# Patient Record
Sex: FEMALE | Race: WHITE | HISPANIC OR LATINO
[De-identification: names, ages, dates, MRNs, and addresses within clinical notes are randomized per-mention and may not be internally consistent; named-entity substitution may affect disease eponyms.]

---

## 2018-05-22 ENCOUNTER — RESULT REVIEW (OUTPATIENT)
Age: 68
End: 2018-05-22

## 2020-07-31 ENCOUNTER — APPOINTMENT (OUTPATIENT)
Dept: DISASTER EMERGENCY | Facility: CLINIC | Age: 70
End: 2020-07-31

## 2020-07-31 DIAGNOSIS — Z01.818 ENCOUNTER FOR OTHER PREPROCEDURAL EXAMINATION: ICD-10-CM

## 2020-08-01 LAB — SARS-COV-2 N GENE NPH QL NAA+PROBE: NOT DETECTED

## 2021-04-17 ENCOUNTER — EMERGENCY (EMERGENCY)
Facility: HOSPITAL | Age: 71
LOS: 1 days | Discharge: HOME | End: 2021-04-17
Attending: EMERGENCY MEDICINE | Admitting: EMERGENCY MEDICINE
Payer: MEDICARE

## 2021-04-17 VITALS
HEIGHT: 64 IN | HEART RATE: 72 BPM | TEMPERATURE: 99 F | RESPIRATION RATE: 16 BRPM | SYSTOLIC BLOOD PRESSURE: 160 MMHG | DIASTOLIC BLOOD PRESSURE: 87 MMHG | OXYGEN SATURATION: 98 %

## 2021-04-17 DIAGNOSIS — Z90.49 ACQUIRED ABSENCE OF OTHER SPECIFIED PARTS OF DIGESTIVE TRACT: Chronic | ICD-10-CM

## 2021-04-17 DIAGNOSIS — Z98.89 OTHER SPECIFIED POSTPROCEDURAL STATES: Chronic | ICD-10-CM

## 2021-04-17 DIAGNOSIS — Z98.51 TUBAL LIGATION STATUS: Chronic | ICD-10-CM

## 2021-04-17 PROCEDURE — 99284 EMERGENCY DEPT VISIT MOD MDM: CPT

## 2021-04-17 PROCEDURE — 73030 X-RAY EXAM OF SHOULDER: CPT | Mod: 26,RT

## 2021-04-17 PROCEDURE — 73060 X-RAY EXAM OF HUMERUS: CPT | Mod: 26,RT

## 2021-04-17 RX ORDER — LIDOCAINE 4 G/100G
1 CREAM TOPICAL ONCE
Refills: 0 | Status: COMPLETED | OUTPATIENT
Start: 2021-04-17 | End: 2021-04-17

## 2021-04-17 RX ORDER — KETOROLAC TROMETHAMINE 30 MG/ML
15 SYRINGE (ML) INJECTION ONCE
Refills: 0 | Status: DISCONTINUED | OUTPATIENT
Start: 2021-04-17 | End: 2021-04-17

## 2021-04-17 RX ADMIN — Medication 15 MILLIGRAM(S): at 10:34

## 2021-04-17 RX ADMIN — LIDOCAINE 1 PATCH: 4 CREAM TOPICAL at 10:34

## 2021-04-17 NOTE — ED PROVIDER NOTE - PATIENT PORTAL LINK FT
You can access the FollowMyHealth Patient Portal offered by [facility] by registering at the following website: [email or web address]. By joining [employer]’s FollowMyHealth portal, you will also be able to view your health information using other applications (apps) compatible with our system.

## 2021-04-17 NOTE — ED PROVIDER NOTE - NSFOLLOWUPINSTRUCTIONS_ED_ALL_ED_FT
You were seen for shoulder pain.     Follow up with orthopedic surgery. Bring a copy of your MRI to this visit.     Seek immediate medical assistance for any new or worsening symptoms.     Please take 600 mg of Ibuprofen (aka Motrin, Advil) and/or 650 mg Acetaminophen (aka Tylenol) every 6 hours, as needed, for mild-moderate pain.    Please do not take these medications if you do not have pain or if you have any history of bleeding disorders, kidney or liver disease.   Do not use ibuprofen if you are on blood thinners (anti-coagulation).     A copy of XRra report is provided. There is no fracture or dislocation.

## 2021-04-17 NOTE — ED PROVIDER NOTE - PMH
Aneurysm  thoracic aorta and brain ( 1.5-2mm A comm aneurysm)  both being monitored  Anxiety    Cholecystitis    Fibromyalgia    Heart murmur  benign  High cholesterol    Hypertension    Osteoarthritis    Thyroid nodule

## 2021-04-17 NOTE — ED ADULT TRIAGE NOTE - CHIEF COMPLAINT QUOTE
pt c/o right arm pain x3-4 months. Pt states she was seen at her PCP and was sent to physical therapy but it is not helping. pt states she thinks she hurt it doing something. Appears comfortable.

## 2021-04-17 NOTE — ED PROVIDER NOTE - OBJECTIVE STATEMENT
70F presents to ED with months of left shoulder pain a/w severely reduced ROM. Patient went to physical therapy for 1 month with no improvement. Patient saw ortho surgeon month ago who did MRI and recommended surgery. Patient has been taking tylneol for the pain.

## 2021-04-17 NOTE — ED PROVIDER NOTE - CLINICAL SUMMARY MEDICAL DECISION MAKING FREE TEXT BOX
Radha - elderly female with chroic shoulder pain, vss exam consistent with rotator cuff tear versus frozen shoulder. Patient had outpt MRI in past and surgeon recc surgery but pt did not have it done. Plan: pain control, XR, ortho fullow up.

## 2021-04-17 NOTE — ED PROVIDER NOTE - ATTENDING CONTRIBUTION TO CARE
agree with resident note    "70F presents to ED with months of left shoulder pain a/w severely reduced ROM. Patient went to physical therapy for 1 month with no improvement. Patient saw ortho surgeon month ago who did MRI and recommended surgery. Patient has been taking tylneol for the pain."    PE: well appearing; VSS; CTAB/L; s1 s2 no m/r/g ext: able to range  left shoulder to 45 degrees; no deformity; radial pulse 2+ on left    Imp: per hx MRI showed surgical pathology ?rotator cuff injury; pt can follow up with that orthopedic physician; xray negative for fx/dislocation

## 2021-04-17 NOTE — ED PROVIDER NOTE - PHYSICAL EXAMINATION
Physical Exam:    I have reviewed the triage vital signs.    Gen: NAD, AOx3, non-toxic appearing, able to ambulate without assistance  Head and Neck: NCAT, Neck supple without meningismus   HEENT: EOMI, PEERLA, normal conjunctiva, tongue midline, oral mucosa moist  Lung: CTAB, no respiratory distress, no wheezes/rhonchi/rales B/L, speaking in full sentences  CV: RRR, no murmurs, rubs or gallops  Abd: soft, NT, ND, no guarding, no rigidity, no rebound tenderness, no CVA tenderness, no masses.   MSK: no gross deformities, ROM normal in LE, no back tenderness. LEft UE - ROM reduced in all planes of motion. Patient cannot lift arm above head. Pain with rom. ttp shoulder. No erythema swelling dislocation deformity or eccymois.   Neuro: CNs II-XII grossly intact. No focal sensory or motor deficits  Skin: Warm, well perfused, no rash, no leg swelling  Psych: Appropriate mood and affect

## 2021-04-20 ENCOUNTER — APPOINTMENT (OUTPATIENT)
Dept: ORTHOPEDIC SURGERY | Facility: CLINIC | Age: 71
End: 2021-04-20
Payer: MEDICARE

## 2021-04-20 VITALS
HEART RATE: 76 BPM | BODY MASS INDEX: 27.29 KG/M2 | DIASTOLIC BLOOD PRESSURE: 84 MMHG | HEIGHT: 63 IN | SYSTOLIC BLOOD PRESSURE: 173 MMHG | WEIGHT: 154 LBS

## 2021-04-20 DIAGNOSIS — M25.511 PAIN IN RIGHT SHOULDER: ICD-10-CM

## 2021-04-20 PROCEDURE — 99203 OFFICE O/P NEW LOW 30 MIN: CPT

## 2021-07-26 ENCOUNTER — APPOINTMENT (OUTPATIENT)
Dept: SPEECH THERAPY | Facility: HOSPITAL | Age: 71
End: 2021-07-26
Payer: MEDICARE

## 2021-07-26 ENCOUNTER — OUTPATIENT (OUTPATIENT)
Dept: OUTPATIENT SERVICES | Facility: HOSPITAL | Age: 71
LOS: 1 days | Discharge: HOME | End: 2021-07-26

## 2021-07-26 ENCOUNTER — OUTPATIENT (OUTPATIENT)
Dept: OUTPATIENT SERVICES | Facility: HOSPITAL | Age: 71
LOS: 1 days | End: 2021-07-26

## 2021-07-26 ENCOUNTER — APPOINTMENT (OUTPATIENT)
Dept: RADIOLOGY | Facility: HOSPITAL | Age: 71
End: 2021-07-26
Payer: MEDICARE

## 2021-07-26 DIAGNOSIS — Z90.49 ACQUIRED ABSENCE OF OTHER SPECIFIED PARTS OF DIGESTIVE TRACT: Chronic | ICD-10-CM

## 2021-07-26 DIAGNOSIS — Z98.51 TUBAL LIGATION STATUS: Chronic | ICD-10-CM

## 2021-07-26 DIAGNOSIS — Z98.89 OTHER SPECIFIED POSTPROCEDURAL STATES: Chronic | ICD-10-CM

## 2021-07-26 DIAGNOSIS — R13.14 DYSPHAGIA, PHARYNGOESOPHAGEAL PHASE: ICD-10-CM

## 2021-07-26 PROCEDURE — 74230 X-RAY XM SWLNG FUNCJ C+: CPT | Mod: 26

## 2021-08-06 DIAGNOSIS — R13.10 DYSPHAGIA, UNSPECIFIED: ICD-10-CM

## 2021-09-01 ENCOUNTER — APPOINTMENT (OUTPATIENT)
Dept: NEUROSURGERY | Facility: CLINIC | Age: 71
End: 2021-09-01

## 2023-05-22 ENCOUNTER — APPOINTMENT (OUTPATIENT)
Dept: RADIOLOGY | Facility: HOSPITAL | Age: 73
End: 2023-05-22
Payer: MEDICARE

## 2023-05-22 ENCOUNTER — OUTPATIENT (OUTPATIENT)
Dept: OUTPATIENT SERVICES | Facility: HOSPITAL | Age: 73
LOS: 1 days | End: 2023-05-22

## 2023-05-22 ENCOUNTER — OUTPATIENT (OUTPATIENT)
Dept: OUTPATIENT SERVICES | Facility: HOSPITAL | Age: 73
LOS: 1 days | Discharge: HOME | End: 2023-05-22

## 2023-05-22 ENCOUNTER — APPOINTMENT (OUTPATIENT)
Dept: SPEECH THERAPY | Facility: HOSPITAL | Age: 73
End: 2023-05-22
Payer: MEDICARE

## 2023-05-22 DIAGNOSIS — Z98.51 TUBAL LIGATION STATUS: Chronic | ICD-10-CM

## 2023-05-22 DIAGNOSIS — Z90.49 ACQUIRED ABSENCE OF OTHER SPECIFIED PARTS OF DIGESTIVE TRACT: Chronic | ICD-10-CM

## 2023-05-22 DIAGNOSIS — Z98.89 OTHER SPECIFIED POSTPROCEDURAL STATES: Chronic | ICD-10-CM

## 2023-05-22 DIAGNOSIS — R13.14 DYSPHAGIA, PHARYNGOESOPHAGEAL PHASE: ICD-10-CM

## 2023-05-22 PROCEDURE — 74220 X-RAY XM ESOPHAGUS 1CNTRST: CPT | Mod: 26

## 2023-05-22 PROCEDURE — 74230 X-RAY XM SWLNG FUNCJ C+: CPT | Mod: 26

## 2023-05-22 NOTE — HISTORY OF PRESENT ILLNESS
[FreeTextEntry1] : Patient arrived to Radiology for an OutPatient Modified Barium Swallow Study. Patient is a 73 y/o female with PMH as per EMR:\par \par Active Problems\par Abnormal MRA, brain (793.0) (R90.89)\par Cerebral arterial aneurysm (437.3) (I67.1)\par Distal radius fracture (813.42) (S52.509A)\par Preop testing (V72.84) (Z01.818)\par \par Past Medical History\par History of Aneurysm of thoracic aorta (441.2) (I71.2)\par History of Anxiety (300.00) (F41.9)\par History of Cerebral arterial aneurysm (437.3) (I67.1)\par History of cardiac disorder (V12.50) (Z86.79)\par History of fibromyositis (V13.59) (Z87.39)\par History of hypercholesterolemia (V12.29) (Z86.39)\par History of hypertension (V12.59) (Z86.79)\par History of Osteoarthritis (V13.4)\par Personal history of cardiac murmur (V12.59) (Z86.79)\par Personal history of osteoporosis (V13.59) (Z87.39)\par \par Of Note: Patient had an OutPatient Modified Barium Swallow Study completed back on July 26, 2021 revealed Functional Oral and Pharyngeal Stage swallowing mechanism (See Report for details). \par \par Patient offers c/o "choke easily, feels stuck in my throat, choke all the time with rice, I have right side pain when I swallow" for the past couple of years. No Heimlich maneuver.   Patient reports eating Regular with Thin Liquids. Patient reports no current/repeated pneumonia.  This Modified Barium Swallow Study is to objectively assess the Physiology of the Oral and Pharyngeal Stage swallowing mechanism for treatment plan for least restrictive diet. \par \par Referring MD: Dr. Alonso Romero\par Fax: [phone]\par \par

## 2023-05-22 NOTE — ASSESSMENT
[FreeTextEntry1] : Patient presents with Functional Oral and Pharyngeal Stage swallowing mechanism. The Oral Stage is characterized by adequate oral containment, adequate chewing for solid, adequate bolus manipulation, adequate tongue motion with adequate anterior to posterior transfer of the bolus for puree, solids; with adequate oral clearance. The Pharyngeal Stage is characterized by adequate initiation of the pharyngeal swallow, adequate laryngeal elevation, adequate tongue base retraction and adequate pharyngeal constriction. There is adequate pharyngeal clearance post swallow for puree/solids. There was Laryngeal Penetration during the swallow for Thin liquids with retrieval and airway protection maintained. There was No Aspiration observed before, during or after the swallow for puree, solids, thin liquids.\par \par RESULTS:\par 1.) No Aspiration observed before, during or after the swallow for puree, solids, thin liquids.\par 2.) Laryngeal Penetration during the swallow for Thin Liquids with retrieval and airway protection maintained. \par \par Of Note: Patient was seen for a Barium Swallow/Esophagram (See Radiologist report). \par

## 2023-05-22 NOTE — PLAN
[FreeTextEntry2] : \par 1.) Continue with current diet regimen of Regular and Thin Liquids\par 2.) Aspiration Precautions\par 3.) Reflux Precautions\par 4.) Maintain Good Oral Hygiene Care\par 5.) Follow up with Physician\par \par SLP provided Patient education regarding preliminary results. Patient verbalized understanding and will follow up with Physician. \par

## 2023-05-23 ENCOUNTER — OUTPATIENT (OUTPATIENT)
Dept: OUTPATIENT SERVICES | Facility: HOSPITAL | Age: 73
LOS: 1 days | Discharge: HOME | End: 2023-05-23

## 2023-05-23 DIAGNOSIS — Z90.49 ACQUIRED ABSENCE OF OTHER SPECIFIED PARTS OF DIGESTIVE TRACT: Chronic | ICD-10-CM

## 2023-05-23 DIAGNOSIS — Z98.89 OTHER SPECIFIED POSTPROCEDURAL STATES: Chronic | ICD-10-CM

## 2023-05-23 DIAGNOSIS — Z98.51 TUBAL LIGATION STATUS: Chronic | ICD-10-CM

## 2023-05-25 DIAGNOSIS — R13.10 DYSPHAGIA, UNSPECIFIED: ICD-10-CM

## 2024-09-15 ENCOUNTER — EMERGENCY (EMERGENCY)
Facility: HOSPITAL | Age: 74
LOS: 1 days | Discharge: HOME | End: 2024-09-15
Attending: EMERGENCY MEDICINE
Payer: MEDICARE

## 2024-09-15 VITALS
SYSTOLIC BLOOD PRESSURE: 166 MMHG | TEMPERATURE: 98 F | DIASTOLIC BLOOD PRESSURE: 80 MMHG | RESPIRATION RATE: 15 BRPM | HEART RATE: 58 BPM | OXYGEN SATURATION: 97 %

## 2024-09-15 VITALS
RESPIRATION RATE: 18 BRPM | SYSTOLIC BLOOD PRESSURE: 174 MMHG | OXYGEN SATURATION: 96 % | WEIGHT: 149.91 LBS | HEIGHT: 62 IN | DIASTOLIC BLOOD PRESSURE: 93 MMHG | HEART RATE: 76 BPM | TEMPERATURE: 98 F

## 2024-09-15 DIAGNOSIS — Z98.51 TUBAL LIGATION STATUS: Chronic | ICD-10-CM

## 2024-09-15 DIAGNOSIS — Z90.49 ACQUIRED ABSENCE OF OTHER SPECIFIED PARTS OF DIGESTIVE TRACT: Chronic | ICD-10-CM

## 2024-09-15 DIAGNOSIS — Z98.89 OTHER SPECIFIED POSTPROCEDURAL STATES: Chronic | ICD-10-CM

## 2024-09-15 LAB
ALBUMIN SERPL ELPH-MCNC: 4 G/DL — SIGNIFICANT CHANGE UP (ref 3.3–5)
ALP SERPL-CCNC: 93 U/L — SIGNIFICANT CHANGE UP (ref 40–120)
ALT FLD-CCNC: 13 U/L — SIGNIFICANT CHANGE UP (ref 10–45)
ANION GAP SERPL CALC-SCNC: 10 MMOL/L — SIGNIFICANT CHANGE UP (ref 5–17)
AST SERPL-CCNC: 14 U/L — SIGNIFICANT CHANGE UP (ref 10–40)
BASOPHILS # BLD AUTO: 0.03 K/UL — SIGNIFICANT CHANGE UP (ref 0–0.2)
BASOPHILS NFR BLD AUTO: 0.4 % — SIGNIFICANT CHANGE UP (ref 0–2)
BILIRUB SERPL-MCNC: 0.2 MG/DL — SIGNIFICANT CHANGE UP (ref 0.2–1.2)
BUN SERPL-MCNC: 12 MG/DL — SIGNIFICANT CHANGE UP (ref 7–23)
CALCIUM SERPL-MCNC: 9.7 MG/DL — SIGNIFICANT CHANGE UP (ref 8.4–10.5)
CHLORIDE SERPL-SCNC: 107 MMOL/L — SIGNIFICANT CHANGE UP (ref 96–108)
CO2 SERPL-SCNC: 24 MMOL/L — SIGNIFICANT CHANGE UP (ref 22–31)
CREAT SERPL-MCNC: 0.62 MG/DL — SIGNIFICANT CHANGE UP (ref 0.5–1.3)
EGFR: 93 ML/MIN/1.73M2 — SIGNIFICANT CHANGE UP
EOSINOPHIL # BLD AUTO: 0.15 K/UL — SIGNIFICANT CHANGE UP (ref 0–0.5)
EOSINOPHIL NFR BLD AUTO: 2 % — SIGNIFICANT CHANGE UP (ref 0–6)
GLUCOSE SERPL-MCNC: 133 MG/DL — HIGH (ref 70–99)
HCT VFR BLD CALC: 40.4 % — SIGNIFICANT CHANGE UP (ref 34.5–45)
HGB BLD-MCNC: 13.3 G/DL — SIGNIFICANT CHANGE UP (ref 11.5–15.5)
IMM GRANULOCYTES NFR BLD AUTO: 0.3 % — SIGNIFICANT CHANGE UP (ref 0–0.9)
LIDOCAIN IGE QN: 33 U/L — SIGNIFICANT CHANGE UP (ref 7–60)
LYMPHOCYTES # BLD AUTO: 1.74 K/UL — SIGNIFICANT CHANGE UP (ref 1–3.3)
LYMPHOCYTES # BLD AUTO: 22.8 % — SIGNIFICANT CHANGE UP (ref 13–44)
MCHC RBC-ENTMCNC: 30.4 PG — SIGNIFICANT CHANGE UP (ref 27–34)
MCHC RBC-ENTMCNC: 32.9 GM/DL — SIGNIFICANT CHANGE UP (ref 32–36)
MCV RBC AUTO: 92.4 FL — SIGNIFICANT CHANGE UP (ref 80–100)
MONOCYTES # BLD AUTO: 0.9 K/UL — SIGNIFICANT CHANGE UP (ref 0–0.9)
MONOCYTES NFR BLD AUTO: 11.8 % — SIGNIFICANT CHANGE UP (ref 2–14)
NEUTROPHILS # BLD AUTO: 4.8 K/UL — SIGNIFICANT CHANGE UP (ref 1.8–7.4)
NEUTROPHILS NFR BLD AUTO: 62.7 % — SIGNIFICANT CHANGE UP (ref 43–77)
NRBC # BLD: 0 /100 WBCS — SIGNIFICANT CHANGE UP (ref 0–0)
NT-PROBNP SERPL-SCNC: 86 PG/ML — SIGNIFICANT CHANGE UP (ref 0–300)
PLATELET # BLD AUTO: 238 K/UL — SIGNIFICANT CHANGE UP (ref 150–400)
POTASSIUM SERPL-MCNC: 3.9 MMOL/L — SIGNIFICANT CHANGE UP (ref 3.5–5.3)
POTASSIUM SERPL-SCNC: 3.9 MMOL/L — SIGNIFICANT CHANGE UP (ref 3.5–5.3)
PROT SERPL-MCNC: 6.9 G/DL — SIGNIFICANT CHANGE UP (ref 6–8.3)
RBC # BLD: 4.37 M/UL — SIGNIFICANT CHANGE UP (ref 3.8–5.2)
RBC # FLD: 12.5 % — SIGNIFICANT CHANGE UP (ref 10.3–14.5)
SODIUM SERPL-SCNC: 141 MMOL/L — SIGNIFICANT CHANGE UP (ref 135–145)
TROPONIN T, HIGH SENSITIVITY RESULT: 6 NG/L — SIGNIFICANT CHANGE UP (ref 0–51)
WBC # BLD: 7.64 K/UL — SIGNIFICANT CHANGE UP (ref 3.8–10.5)
WBC # FLD AUTO: 7.64 K/UL — SIGNIFICANT CHANGE UP (ref 3.8–10.5)

## 2024-09-15 PROCEDURE — 71045 X-RAY EXAM CHEST 1 VIEW: CPT

## 2024-09-15 PROCEDURE — 99285 EMERGENCY DEPT VISIT HI MDM: CPT | Mod: 25

## 2024-09-15 PROCEDURE — 80053 COMPREHEN METABOLIC PANEL: CPT

## 2024-09-15 PROCEDURE — 99284 EMERGENCY DEPT VISIT MOD MDM: CPT | Mod: GC

## 2024-09-15 PROCEDURE — 36415 COLL VENOUS BLD VENIPUNCTURE: CPT

## 2024-09-15 PROCEDURE — 83880 ASSAY OF NATRIURETIC PEPTIDE: CPT

## 2024-09-15 PROCEDURE — 96374 THER/PROPH/DIAG INJ IV PUSH: CPT

## 2024-09-15 PROCEDURE — 84484 ASSAY OF TROPONIN QUANT: CPT

## 2024-09-15 PROCEDURE — 82962 GLUCOSE BLOOD TEST: CPT

## 2024-09-15 PROCEDURE — 85025 COMPLETE CBC W/AUTO DIFF WBC: CPT

## 2024-09-15 PROCEDURE — 71045 X-RAY EXAM CHEST 1 VIEW: CPT | Mod: 26

## 2024-09-15 PROCEDURE — 83690 ASSAY OF LIPASE: CPT

## 2024-09-15 RX ORDER — ACETAMINOPHEN 325 MG/1
1000 TABLET ORAL ONCE
Refills: 0 | Status: COMPLETED | OUTPATIENT
Start: 2024-09-15 | End: 2024-09-15

## 2024-09-15 RX ORDER — LIDOCAINE/BENZALKONIUM/ALCOHOL
1 SOLUTION, NON-ORAL TOPICAL ONCE
Refills: 0 | Status: COMPLETED | OUTPATIENT
Start: 2024-09-15 | End: 2024-09-15

## 2024-09-15 RX ADMIN — ACETAMINOPHEN 400 MILLIGRAM(S): 325 TABLET ORAL at 15:13

## 2024-09-15 RX ADMIN — Medication 1 PATCH: at 15:13

## 2024-09-15 NOTE — ED PROVIDER NOTE - NSFOLLOWUPCLINICS_GEN_ALL_ED_FT
[facility]  Neurology  [address] [place] [address] [address] [place] [address]  Phone: [phone]  Fax:

## 2024-09-15 NOTE — ED ADULT NURSE NOTE - CHIEF COMPLAINT QUOTE
atraumatic left arm since yesterday, intermittent, "feels like stabbing." Also endorsing dry cough with yellow mucous x 1 week  returned from [employer] vacation 1 week ago

## 2024-09-15 NOTE — ED PROVIDER NOTE - NSFOLLOWUPINSTRUCTIONS_ED_ALL_ED_FT
You were evaluated in the emergency department for left arm pain.  Your labs and evaluation were negative.      Return to the emergency department for any new or concerning symptoms.      We recommend you take 600mg ibuprofen every 6 hours or tylenol 650mg every 6 hours as needed for pain. If needed, you can alternate these medications so that you take one medication every 3 hours. For instance, at noon take ibuprofen, then at 3pm take tylenol, then at 6pm take ibuprofen.    Please follow up with your primary care physician in 2-3 days and also followup with Cardiology.

## 2024-09-15 NOTE — ED ADULT TRIAGE NOTE - CHIEF COMPLAINT QUOTE
atraumatic left arm since yesterday, intermittent, "feels like stabbing." Also endorsing dry cough with yellow mucous x 1 week  returned from [employer] vacation 1 week ago

## 2024-09-15 NOTE — ED PROVIDER NOTE - PATIENT PORTAL LINK FT
You can access the FollowMyHealth Patient Portal offered by [facility] by registering at the following website: [email or web address]. By joining [employer]’s FollowMyHealth portal, you will also be able to view your health information using other applications (apps) compatible with our system.

## 2024-09-15 NOTE — ED PROVIDER NOTE - CLINICAL SUMMARY MEDICAL DECISION MAKING FREE TEXT BOX
Dr. Perez (Attending Physician)  Subjective:  Summary : Patient presented with intermittent sharp pain in the left arm, primarily located around the upper medial left arm area with some associated tingling in the leg. The pain was likened to being pricked with a needle or knife, was quite intense but lasted only for short durations (~3 minutes). There was a concern about swelling that has resolved.  - Chief Complaint (CC) : Sharp, intense intermittent left arm pain  - History of Present Illness : 73 year old female, reported the onset of the described symptoms was the previous night and had repeated episodes since, with some relief in between. She visited the [employer] this morning and notices recurrences of the pain on return. She also reported a sense of fear due to the severity of the discomfort. The pain is associated with tingling in the leg. The patient has been dealing with a cough for about two weeks and expressed some fear that the symptom could relate to a blood-related issue. She reported having a spasm in the ribs a while ago and suspected it to link with the present symptoms. The patient has a history of routinely getting her heart checked by a cardiologist, with a stress test conducted a few months back presumed negative.  - Past Medical History :  high cholesterol, hypertension, pancreatitis, acid reflux, diverticulosis, ulcers, arthritis  - Past Surgical History : cholecystectomy  - Family History : No information provided.  - Social History : The patient did not provide specifics about her social history. However, it was mentioned that she attends [employer] and appears to be religiously inclined.  - Review of Systems :  General: Patient reported fatigue due to loss of sleep caused by the arm pain. No additional symptoms related to appetite or weight were mentioned.  Neurological: Patient reported tingling sensation in her leg associated with the arm pain. No specific neurological deficits were reported.  Musculoskeletal: The patient reports chronic arthritis pain, which is managed with Tylenol. The fresh symptom relates to an intense, stabbing pain in the left arm.  Cardiovascular: While patient does see a cardiologist regularly, no specific cardiovascular symptoms were reported at this time.  Respiratory: Patient has a chronic cough persisting over the past two weeks.  Gastrointestinal: Past history of pancreatitis, ulcers, and diverticulosis. Patient did not report any current gastrointestinal symptoms.  Genitourinary: No symptoms reported.  Integumentary: No symptoms reported.  Psychiatric: Patient expressed substantial anxiety over her current symptoms.  Medications: Patient takes medication for high blood pressure, cholesterol, and Tylenol for arthritis pain.  Allergies: No allergies were reported.    Objective:  - Diagnostic Results : Test results are pending.  - Vital Signs : Normal  - Physical Examination (PE) : The patient's examination revealed no observable swelling in the affected arm. On manual examination, there was no distinct indication of a blood clot. The patient's arm did not show increased pain on applying pressure or during muscle movements. The patient's lungs were also examined and were clear. Heart RRR, no m/r/g  Assessment and Plan:  - Left Arm Discomfort and Sharp Pain : The presented symptoms raise suspicions of either musculoskeletal pain or potential nerve related pain  - Therapeutic Interventions: Plan to administer Tylenol for pain relief, lidocaine patch    - Diagnostic Tests: Conduct necessary blood work, trop, and also obtain a chest X-ray to rule out any possible respiratory issues given patient's history of coughing. Likely msk vs. nerve pain. Low suspicion for blood clots.

## 2024-09-15 NOTE — ED ADULT NURSE NOTE - NSFALLUNIVINTERV_ED_ALL_ED
Bed/Stretcher in lowest position, wheels locked, appropriate side rails in place/Call bell, personal items and telephone in reach/Instruct patient to call for assistance before getting out of bed/chair/stretcher/Non-slip footwear applied when patient is off stretcher/[place] to call system/Physically safe environment - no spills, clutter or unnecessary equipment/Purposeful proactive rounding/Room/bathroom lighting operational, light cord in reach

## 2024-09-15 NOTE — ED PROVIDER NOTE - PROGRESS NOTE DETAILS
Dr. Kellee Parker, ATTENDING: Patient reassessed at bedside.  Reports feeling better would like to go home.  Labs reviewed and all negative.  Patient has good outpatient follow-up and had recent stress and echo, less likely ACS.  Strict return precautions discussed and all questions answered.

## 2025-01-28 ENCOUNTER — OFFICE (OUTPATIENT)
Dept: URBAN - METROPOLITAN AREA CLINIC 28 | Facility: CLINIC | Age: 75
Setting detail: OPHTHALMOLOGY
End: 2025-01-28
Payer: MEDICARE

## 2025-01-28 ENCOUNTER — RX ONLY (RX ONLY)
Age: 75
End: 2025-01-28

## 2025-01-28 DIAGNOSIS — H02.402: ICD-10-CM

## 2025-01-28 DIAGNOSIS — H43.392: ICD-10-CM

## 2025-01-28 DIAGNOSIS — H26.492: ICD-10-CM

## 2025-01-28 DIAGNOSIS — H52.03: ICD-10-CM

## 2025-01-28 PROCEDURE — 92004 COMPRE OPH EXAM NEW PT 1/>: CPT | Performed by: OPHTHALMOLOGY

## 2025-01-28 PROCEDURE — 92015 DETERMINE REFRACTIVE STATE: CPT | Performed by: OPHTHALMOLOGY

## 2025-01-28 ASSESSMENT — CONFRONTATIONAL VISUAL FIELD TEST (CVF)
OD_FINDINGS: FULL
OS_FINDINGS: FULL

## 2025-01-28 ASSESSMENT — REFRACTION_AUTOREFRACTION
OD_SPHERE: +0.75
OD_CYLINDER: -1.00
OD_AXIS: 087
OS_SPHERE: +0.75
OS_AXIS: 031
OS_CYLINDER: -1.00

## 2025-01-28 ASSESSMENT — KERATOMETRY
OS_K1POWER_DIOPTERS: 38.75
OD_K2POWER_DIOPTERS: 38.50
OS_K2POWER_DIOPTERS: 39.50
OD_AXISANGLE_DEGREES: 003
OD_K1POWER_DIOPTERS: 37.75
OS_AXISANGLE_DEGREES: 145

## 2025-01-28 ASSESSMENT — REFRACTION_MANIFEST
OS_SPHERE: +0.75
OS_AXIS: 030
OD_SPHERE: PLANO
OS_VA1: 20/25
OS_CYLINDER: -1.00

## 2025-01-28 ASSESSMENT — LID POSITION - PTOSIS: OS_PTOSIS: LUL 1+

## 2025-01-28 ASSESSMENT — VISUAL ACUITY
OS_BCVA: 20/80
OD_BCVA: 20/30-1

## 2025-01-28 ASSESSMENT — TONOMETRY
OS_IOP_MMHG: 17
OD_IOP_MMHG: 18

## 2025-01-29 PROBLEM — H53.001 AMBLYOPIA UNSPECIFIED; RIGHT EYE: Status: ACTIVE | Noted: 2025-01-28

## 2025-01-29 PROBLEM — H02.402 PTOSIS; LEFT EYE: Status: ACTIVE | Noted: 2025-01-28

## 2025-02-05 ENCOUNTER — OFFICE (OUTPATIENT)
Dept: URBAN - METROPOLITAN AREA CLINIC 28 | Facility: CLINIC | Age: 75
Setting detail: OPHTHALMOLOGY
End: 2025-02-05
Payer: MEDICARE

## 2025-02-05 DIAGNOSIS — H02.402: ICD-10-CM

## 2025-02-05 DIAGNOSIS — H02.834: ICD-10-CM

## 2025-02-05 DIAGNOSIS — H53.40: ICD-10-CM

## 2025-02-05 DIAGNOSIS — H02.401: ICD-10-CM

## 2025-02-05 DIAGNOSIS — H02.831: ICD-10-CM

## 2025-02-05 PROCEDURE — 92285 EXTERNAL OCULAR PHOTOGRAPHY: CPT | Performed by: OPHTHALMOLOGY

## 2025-02-05 PROCEDURE — 92082 INTERMEDIATE VISUAL FIELD XM: CPT | Performed by: OPHTHALMOLOGY

## 2025-02-05 PROCEDURE — 99214 OFFICE O/P EST MOD 30 MIN: CPT | Performed by: OPHTHALMOLOGY

## 2025-02-05 ASSESSMENT — CONFRONTATIONAL VISUAL FIELD TEST (CVF)
OD_FINDINGS: FULL
OS_FINDINGS: FULL

## 2025-02-05 ASSESSMENT — LID POSITION - DERMATOCHALASIS
OD_DERMATOCHALASIS: 1+
OS_DERMATOCHALASIS: 1+

## 2025-02-05 ASSESSMENT — LID POSITION - PTOSIS
OS_PTOSIS: LUL 1+
OD_PTOSIS: RUL 1+

## 2025-02-06 ASSESSMENT — KERATOMETRY
OD_K2POWER_DIOPTERS: 38.50
OS_K1POWER_DIOPTERS: 38.75
OS_AXISANGLE_DEGREES: 145
OD_AXISANGLE_DEGREES: 003
OS_K2POWER_DIOPTERS: 39.50
OD_K1POWER_DIOPTERS: 37.75

## 2025-02-06 ASSESSMENT — REFRACTION_AUTOREFRACTION
OS_AXIS: 031
OS_SPHERE: +0.75
OS_CYLINDER: -1.00
OD_SPHERE: +0.75
OD_CYLINDER: -1.00
OD_AXIS: 087

## 2025-02-06 ASSESSMENT — VISUAL ACUITY
OD_BCVA: 20/30
OS_BCVA: 20/70

## 2025-03-31 ENCOUNTER — AMBULATORY SURGERY CENTER (OUTPATIENT)
Dept: URBAN - METROPOLITAN AREA SURGERY 21 | Facility: SURGERY | Age: 75
Setting detail: OPHTHALMOLOGY
End: 2025-03-31
Payer: MEDICARE

## 2025-03-31 DIAGNOSIS — H02.403: ICD-10-CM

## 2025-03-31 PROCEDURE — 67903 REPAIR EYELID DEFECT: CPT | Mod: 50 | Performed by: OPHTHALMOLOGY

## 2025-04-07 ENCOUNTER — OFFICE (OUTPATIENT)
Dept: URBAN - METROPOLITAN AREA CLINIC 29 | Facility: CLINIC | Age: 75
Setting detail: OPHTHALMOLOGY
End: 2025-04-07
Payer: MEDICARE

## 2025-04-07 DIAGNOSIS — H02.402: ICD-10-CM

## 2025-04-07 DIAGNOSIS — H02.831: ICD-10-CM

## 2025-04-07 DIAGNOSIS — H02.834: ICD-10-CM

## 2025-04-07 DIAGNOSIS — H02.401: ICD-10-CM

## 2025-04-07 PROCEDURE — 99024 POSTOP FOLLOW-UP VISIT: CPT | Performed by: OPHTHALMOLOGY

## 2025-04-07 ASSESSMENT — REFRACTION_AUTOREFRACTION
OS_AXIS: 031
OD_SPHERE: +0.75
OD_AXIS: 087
OD_CYLINDER: -1.00
OS_SPHERE: +0.75
OS_CYLINDER: -1.00

## 2025-04-07 ASSESSMENT — KERATOMETRY
OD_K2POWER_DIOPTERS: 38.50
OD_K1POWER_DIOPTERS: 37.75
OD_AXISANGLE_DEGREES: 003
OS_K1POWER_DIOPTERS: 38.75
OS_K2POWER_DIOPTERS: 39.50
OS_AXISANGLE_DEGREES: 145

## 2025-04-07 ASSESSMENT — VISUAL ACUITY
OS_BCVA: 20/70
OD_BCVA: 20/30

## 2025-04-07 ASSESSMENT — SUPERFICIAL PUNCTATE KERATITIS (SPK)
OS_SPK: 1+
OD_SPK: 1+

## 2025-04-30 ENCOUNTER — OFFICE (OUTPATIENT)
Dept: URBAN - METROPOLITAN AREA CLINIC 28 | Facility: CLINIC | Age: 75
Setting detail: OPHTHALMOLOGY
End: 2025-04-30
Payer: MEDICARE

## 2025-04-30 DIAGNOSIS — H02.402: ICD-10-CM

## 2025-04-30 DIAGNOSIS — H02.401: ICD-10-CM

## 2025-04-30 PROBLEM — H16.223 DRY EYE SYNDROME K SICCA; BOTH EYES: Status: ACTIVE | Noted: 2025-04-07

## 2025-04-30 PROCEDURE — 92012 INTRM OPH EXAM EST PATIENT: CPT | Performed by: OPHTHALMOLOGY

## 2025-04-30 ASSESSMENT — CONFRONTATIONAL VISUAL FIELD TEST (CVF)
OD_FINDINGS: FULL
OS_FINDINGS: FULL

## 2025-04-30 ASSESSMENT — VISUAL ACUITY
OD_BCVA: 20/25-1
OS_BCVA: 20/100

## 2025-05-07 ENCOUNTER — RX ONLY (RX ONLY)
Age: 75
End: 2025-05-07

## 2025-05-07 ENCOUNTER — OFFICE (OUTPATIENT)
Dept: URBAN - METROPOLITAN AREA CLINIC 28 | Facility: CLINIC | Age: 75
Setting detail: OPHTHALMOLOGY
End: 2025-05-07
Payer: MEDICARE

## 2025-05-07 DIAGNOSIS — H02.402: ICD-10-CM

## 2025-05-07 DIAGNOSIS — H02.401: ICD-10-CM

## 2025-05-07 PROCEDURE — 99024 POSTOP FOLLOW-UP VISIT: CPT | Performed by: OPHTHALMOLOGY

## 2025-05-07 ASSESSMENT — KERATOMETRY
OS_K2POWER_DIOPTERS: 39.50
OD_AXISANGLE_DEGREES: 003
OS_K1POWER_DIOPTERS: 38.75
OS_AXISANGLE_DEGREES: 145
OD_K1POWER_DIOPTERS: 37.75
OD_K2POWER_DIOPTERS: 38.50

## 2025-05-07 ASSESSMENT — VISUAL ACUITY
OD_BCVA: 20/25-1
OS_BCVA: 20/100

## 2025-05-07 ASSESSMENT — REFRACTION_AUTOREFRACTION
OD_AXIS: 087
OS_SPHERE: +0.75
OD_CYLINDER: -1.00
OD_SPHERE: +0.75
OS_CYLINDER: -1.00
OS_AXIS: 031

## 2025-05-07 ASSESSMENT — CONFRONTATIONAL VISUAL FIELD TEST (CVF)
OS_FINDINGS: FULL
OD_FINDINGS: FULL

## 2025-05-07 ASSESSMENT — SUPERFICIAL PUNCTATE KERATITIS (SPK): OS_SPK: 1+

## 2025-06-18 ENCOUNTER — OFFICE (OUTPATIENT)
Dept: URBAN - METROPOLITAN AREA CLINIC 28 | Facility: CLINIC | Age: 75
Setting detail: OPHTHALMOLOGY
End: 2025-06-18
Payer: MEDICARE

## 2025-06-18 DIAGNOSIS — H02.402: ICD-10-CM

## 2025-06-18 DIAGNOSIS — H02.401: ICD-10-CM

## 2025-06-18 PROCEDURE — 99024 POSTOP FOLLOW-UP VISIT: CPT | Performed by: OPHTHALMOLOGY

## 2025-06-18 ASSESSMENT — SUPERFICIAL PUNCTATE KERATITIS (SPK): OS_SPK: 1+

## 2025-06-19 ASSESSMENT — REFRACTION_AUTOREFRACTION
OS_SPHERE: +0.75
OD_AXIS: 087
OS_AXIS: 031
OD_CYLINDER: -1.00
OD_SPHERE: +0.75
OS_CYLINDER: -1.00

## 2025-06-19 ASSESSMENT — KERATOMETRY
OS_K1POWER_DIOPTERS: 38.75
OS_AXISANGLE_DEGREES: 145
OD_K2POWER_DIOPTERS: 38.50
OD_K1POWER_DIOPTERS: 37.75
OS_K2POWER_DIOPTERS: 39.50
OD_AXISANGLE_DEGREES: 003

## 2025-06-19 ASSESSMENT — VISUAL ACUITY
OS_BCVA: 20/100
OD_BCVA: 20/40

## 2025-07-02 ENCOUNTER — OFFICE (OUTPATIENT)
Dept: URBAN - METROPOLITAN AREA CLINIC 28 | Facility: CLINIC | Age: 75
Setting detail: OPHTHALMOLOGY
End: 2025-07-02
Payer: MEDICARE

## 2025-07-02 DIAGNOSIS — H16.223: ICD-10-CM

## 2025-07-02 DIAGNOSIS — H02.402: ICD-10-CM

## 2025-07-02 DIAGNOSIS — H02.401: ICD-10-CM

## 2025-07-02 PROCEDURE — 99213 OFFICE O/P EST LOW 20 MIN: CPT | Performed by: OPHTHALMOLOGY

## 2025-07-02 ASSESSMENT — REFRACTION_AUTOREFRACTION
OS_SPHERE: +0.75
OD_CYLINDER: -1.00
OD_AXIS: 087
OS_AXIS: 031
OD_SPHERE: +0.75
OS_CYLINDER: -1.00

## 2025-07-02 ASSESSMENT — KERATOMETRY
OD_K2POWER_DIOPTERS: 38.50
OD_K1POWER_DIOPTERS: 37.75
OS_K1POWER_DIOPTERS: 38.75
OD_AXISANGLE_DEGREES: 003
OS_AXISANGLE_DEGREES: 145
OS_K2POWER_DIOPTERS: 39.50

## 2025-07-02 ASSESSMENT — CONFRONTATIONAL VISUAL FIELD TEST (CVF)
OD_FINDINGS: FULL
OS_FINDINGS: FULL

## 2025-07-02 ASSESSMENT — VISUAL ACUITY
OD_BCVA: 20/20
OS_BCVA: 20/70-1

## 2025-07-02 ASSESSMENT — SUPERFICIAL PUNCTATE KERATITIS (SPK): OS_SPK: 1+

## 2025-08-21 ENCOUNTER — OUTPATIENT (OUTPATIENT)
Dept: OUTPATIENT SERVICES | Facility: HOSPITAL | Age: 75
LOS: 1 days | End: 2025-08-21
Payer: MEDICARE

## 2025-08-21 ENCOUNTER — APPOINTMENT (OUTPATIENT)
Dept: MRI IMAGING | Facility: IMAGING CENTER | Age: 75
End: 2025-08-21

## 2025-08-21 DIAGNOSIS — Z98.51 TUBAL LIGATION STATUS: Chronic | ICD-10-CM

## 2025-08-21 DIAGNOSIS — Z98.89 OTHER SPECIFIED POSTPROCEDURAL STATES: Chronic | ICD-10-CM

## 2025-08-21 DIAGNOSIS — Z90.49 ACQUIRED ABSENCE OF OTHER SPECIFIED PARTS OF DIGESTIVE TRACT: Chronic | ICD-10-CM

## 2025-08-21 DIAGNOSIS — Z00.8 ENCOUNTER FOR OTHER GENERAL EXAMINATION: ICD-10-CM

## 2025-08-21 PROCEDURE — 70551 MRI BRAIN STEM W/O DYE: CPT | Mod: 26

## 2025-08-21 PROCEDURE — 70551 MRI BRAIN STEM W/O DYE: CPT
